# Patient Record
Sex: MALE | Race: WHITE | NOT HISPANIC OR LATINO
[De-identification: names, ages, dates, MRNs, and addresses within clinical notes are randomized per-mention and may not be internally consistent; named-entity substitution may affect disease eponyms.]

---
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
## 2020-07-27 PROBLEM — F31.9 BIPOLAR 1 DISORDER (HCC): Status: ACTIVE | Noted: 2020-07-27

## 2020-07-27 NOTE — ED INITIAL ASSESSMENT (HPI)
Sent here from [facility] for rapid covid swab. Pt is to be admitted for detox. Denies suicidal ideation. Denies any recent exposure to covid.

## 2020-07-28 PROBLEM — K58.9 IBS (IRRITABLE BOWEL SYNDROME): Status: ACTIVE | Noted: 2020-07-28

## 2020-07-28 PROBLEM — J45.909 ASTHMA: Status: ACTIVE | Noted: 2020-07-28

## 2020-07-28 PROBLEM — K21.9 ESOPHAGEAL REFLUX: Status: ACTIVE | Noted: 2020-07-28

## 2020-07-28 NOTE — ED PROVIDER NOTES
Patient Seen in: [facility] Emergency Department      History   Patient presents with:  Testing    Stated Complaint: covid testing    HPI    Sachi Pedroza is a 70-year-old who is here for COVID-19 testing.   He has a history of major depression for the las Resp 18   Temp 98.9 °F (37.2 °C)   Temp src    SpO2 100 %   O2 Device        Current:/79   Pulse 77   Temp 98.9 °F (37.2 °C)   Resp 18   Ht 175.3 cm (5' 9\")   Wt 61.2 kg   SpO2 100%   BMI 19.94 kg/m²         Physical Exam  General: Well appearing Medication List

## 2020-07-28 NOTE — ED NOTES
Report to Shorty Manzano RN at [facility]. [facility] called.

## 2022-11-03 ENCOUNTER — OFF PREMISE (OUTPATIENT)
Dept: OTHER | Age: 22
End: 2022-11-03

## 2022-11-03 PROCEDURE — 93010 ELECTROCARDIOGRAM REPORT: CPT | Performed by: INTERNAL MEDICINE
